# Patient Record
(demographics unavailable — no encounter records)

---

## 2025-01-03 NOTE — HISTORY OF PRESENT ILLNESS
[FreeTextEntry1] : 40y/o  presents to clinic for follow up for abnormal beta hcg values.   Pt was assessed in the ER on  for vaginal spotting and abdominal cramping and was found to have an PUL. Unplanned, undesired pregnancy. Pt continued to have vaginal spotting but generally denied dizziness, visual changes, CP, SOB, abdominal cramping. Pt is still spotting today, unsure how much but has not been saturating pads. Pt is Rh neg and recieved rhogam on  in the ER.  Bhcg values:  : 843 : 710 : 656  Pt desires contraception after resolution of PUL. Pt explains she has previously been on depo and would not like to continue because it makes her gain weight. Counseled on options and given information, interested in a Nexplanon but still deciding.

## 2025-01-03 NOTE — DISCUSSION/SUMMARY
[FreeTextEntry1] : TAUS: Large anterior fibroid noted, endometrial cavity visualized with heterogenous material and cystic structure within endometrial cavity visualized TVUS: Large anterior fibroid noted, endometrial cavity visualized with heterogenous material and cystic structure within endometrial cavity visualized, no evidence of yolk sac or embryo, no free fluid, adnexa normal appearing bilaterally  A/P: 40yo  presents to clinic with PUL, likely abnormal IUP, who desires medical management.   INCOMPLETEEEEEEEE  -Pt counseled extensively on  medical management, expectations, and process - Ectopic precaution s    -mifepristone 200mg PO in clinic  -misoprostol 800mcg vaginally 8-48hrs or buccally 24-48hrs after mifepristone -promethazine 25mg q6hrs for nausea -ibuprofen 800mg q8hrs for pain control and cramping -f/u bhcg 48 hrs after misoprostol -RTC in 2wks for follow up    Risk of medication , including but not limited to: infection, retained products of conception, continuing pregnancy, hemorrhage, need for surgical  or D&C for incomplete  (~5%), potential for teratogenesis in this pregnancy if treatment unsuccessful, blood transfusion, need for further surgery. Discussed side effects, warning symptoms and pain management. Patient agreement forms were signed after discussion of risks and benefits of all management options.  Extensive bleeding and infection precautions were reviewed and written instructions were given to her.  Emergency contact information was provided.     PLAN   #Medication  protocol reviewed in detail.   Mifepristone 200 mg (see RN note) was administered orally in the clinic today after counseling and review of consent forms.  They were given prescriptions for misoprostol 800 mcg.   They were given prescriptions for ibuprofen 800 mg for pain as well as promethazine for nausea.  They were advised to take a dose of promethazine with their first dose of ibuprofen (before or at the time of taking misoprostol) to potentially help with pain.     #Labs*****    #STI screening: Patient was offered screening for sexually transmitted infections    #Contraception and Future Pregnancy Planning For contraception, they plan to ****   #Follow up: 1-2 weeks for in person visit, ultrasound

## 2025-01-03 NOTE — DISCUSSION/SUMMARY
[FreeTextEntry1] : TAUS: Large anterior fibroid noted, endometrial cavity visualized with heterogenous material and cystic structure within endometrial cavity visualized TVUS: Large anterior fibroid noted, endometrial cavity visualized with heterogenous material and cystic structure within endometrial cavity visualized, no evidence of yolk sac or embryo, no free fluid, adnexa normal appearing bilaterally  A/P: 38yo  presents to clinic with PUL, likely abnormal IUP, who desires medical management.   INCOMPLETEEEEEEEE  -Pt counseled extensively on  medical management, expectations, and process - Ectopic precaution s    -mifepristone 200mg PO in clinic  -misoprostol 800mcg vaginally 8-48hrs or buccally 24-48hrs after mifepristone -promethazine 25mg q6hrs for nausea -ibuprofen 800mg q8hrs for pain control and cramping -f/u bhcg 48 hrs after misoprostol -RTC in 2wks for follow up    Risk of medication , including but not limited to: infection, retained products of conception, continuing pregnancy, hemorrhage, need for surgical  or D&C for incomplete  (~5%), potential for teratogenesis in this pregnancy if treatment unsuccessful, blood transfusion, need for further surgery. Discussed side effects, warning symptoms and pain management. Patient agreement forms were signed after discussion of risks and benefits of all management options.  Extensive bleeding and infection precautions were reviewed and written instructions were given to her.  Emergency contact information was provided.     PLAN   #Medication  protocol reviewed in detail.   Mifepristone 200 mg (see RN note) was administered orally in the clinic today after counseling and review of consent forms.  They were given prescriptions for misoprostol 800 mcg.   They were given prescriptions for ibuprofen 800 mg for pain as well as promethazine for nausea.  They were advised to take a dose of promethazine with their first dose of ibuprofen (before or at the time of taking misoprostol) to potentially help with pain.     #Labs*****    #STI screening: Patient was offered screening for sexually transmitted infections    #Contraception and Future Pregnancy Planning For contraception, they plan to ****   #Follow up: 1-2 weeks for in person visit, ultrasound

## 2025-01-03 NOTE — HISTORY OF PRESENT ILLNESS
[FreeTextEntry1] : 38y/o  presents to clinic for follow up for abnormal beta hcg values.   Pt was assessed in the ER on  for vaginal spotting and abdominal cramping and was found to have an PUL. Unplanned, undesired pregnancy. Pt continued to have vaginal spotting but generally denied dizziness, visual changes, CP, SOB, abdominal cramping. Pt is still spotting today, unsure how much but has not been saturating pads. Pt is Rh neg and recieved rhogam on  in the ER.  Bhcg values:  : 843 : 710 : 656  Pt desires contraception after resolution of PUL. Pt explains she has previously been on depo and would not like to continue because it makes her gain weight. Counseled on options and given information, interested in a Nexplanon but still deciding.